# Patient Record
Sex: FEMALE | Race: WHITE
[De-identification: names, ages, dates, MRNs, and addresses within clinical notes are randomized per-mention and may not be internally consistent; named-entity substitution may affect disease eponyms.]

---

## 2019-06-02 ENCOUNTER — HOSPITAL ENCOUNTER (EMERGENCY)
Dept: HOSPITAL 10 - FTE | Age: 44
Discharge: HOME | End: 2019-06-02
Payer: COMMERCIAL

## 2019-06-02 ENCOUNTER — HOSPITAL ENCOUNTER (EMERGENCY)
Dept: HOSPITAL 91 - FTE | Age: 44
Discharge: HOME | End: 2019-06-02
Payer: COMMERCIAL

## 2019-06-02 VITALS — WEIGHT: 242.51 LBS | HEIGHT: 55 IN | BODY MASS INDEX: 56.12 KG/M2

## 2019-06-02 VITALS — HEART RATE: 89 BPM | RESPIRATION RATE: 18 BRPM | DIASTOLIC BLOOD PRESSURE: 89 MMHG | SYSTOLIC BLOOD PRESSURE: 140 MMHG

## 2019-06-02 DIAGNOSIS — N75.1: Primary | ICD-10-CM

## 2019-06-02 PROCEDURE — 56420 I&D BARTHOLINS GLAND ABSCESS: CPT

## 2019-06-02 PROCEDURE — 82962 GLUCOSE BLOOD TEST: CPT

## 2019-06-02 PROCEDURE — 99284 EMERGENCY DEPT VISIT MOD MDM: CPT

## 2019-06-02 RX ADMIN — DIAZEPAM 1 MG: 5 TABLET ORAL at 12:26

## 2019-06-02 RX ADMIN — LIDOCAINE HYDROCHLORIDE 1 ML: 10 INJECTION, SOLUTION EPIDURAL; INFILTRATION; INTRACAUDAL; PERINEURAL at 12:11

## 2019-06-02 NOTE — ERD
ER Documentation


Chief Complaint


Chief Complaint





POSSIBLE ABCESS ON VAGINAL AREA





HPI


43 year old F presents to the ED complaining of a recurrent abscess to her 


vaginal area, progressively growing and painful for the past 5 days. Pt states 


she has had these same sx reoccur for the past 5 years, her last abscess was a


bout 3 months ago. She states these abscesses usually spontaneously drain but 


today's abscess is more large and painful than previous. She denies any 


associated fevers or chills. Denies any urinary symptoms. Denies any other 


symptoms. Of note, pt also is complaining of increased thirst.





ROS


All systems reviewed and are negative except as per history of present illness.





Medications


Home Meds


Active Scripts


Sulfamethoxazole/Trimethoprim* (Bactrim Ds* Tablet) 1 Each Tablet, 1 TAB PO BID,


#14 TAB


   Prov:KATHRYN LARA PA-C         19


Reported Medications


Prenatal Vit/Fe Fumarate/Fa (Prenatal 1-1 Tablet) 1 Tab Tablet, 1 TAB PO DAILY


   3/15/12





Allergies


Allergies:  


Coded Allergies:  


     acetaminophen (Verified  Allergy, Intermediate, 14)


     hydrocodone (Verified  Allergy, Intermediate, 14)


     No Known Allergies (Verified  Allergy, Mild, 2/10/10)





PMhx/Soc


Medical and Surgical Hx:  pt denies Medical Hx, pt denies Surgical Hx


History of Surgery:  No


Anesthesia Reaction:  No


Hx Neurological Disorder:  No


Hx Respiratory Disorders:  No


Hx Cardiac Disorders:  No


Hx Psychiatric Problems:  No


Hx Miscellaneous Medical Probl:  No


Hx Alcohol Use:  No


Hx Substance Use:  No


Hx Tobacco Use:  No


Smoking Status:  Never smoker





FmHx


Family History:  No diabetes





Physical Exam


Vitals





Vital Signs


  Date      Temp  Pulse  Resp  B/P (MAP)   Pulse Ox  O2          O2 Flow    FiO2


Time                                                 Delivery    Rate


    19  98.1     89    18      140/89        99


     11:32                          (106)





Physical Exam


Const:   No acute distress


Head:   Atraumatic 


Eyes:    Normal Conjunctiva


ENT:    Normal External Ears, Nose and Mouth.


Neck:               Full range of motion. No meningismus.


Abd:    Soft, non tender, non distended. Normal bowel sounds


Pelvic Exam:    + tender fluctuant mass in the upper right medial labia majora 


with surrounding erythema.  


Skin:   No petechiae or rashes


Neur:   Awake and alert


Psych:    Normal Mood and Affect


Results 24 hrs





Laboratory Tests


                         Test
            19
12:49


                         Bedside Glucose     88 mg/dL





Current Medications


 Medications
   Dose
          Sig/Hayden
       Start Time
   Status  Last


 (Trade)       Ordered        Route
 PRN     Stop Time              Admin
Dose


                              Reason                                Admin


 Lidocaine
     5 ml           ONCE  ONCE
    19        DC       



(Xylocaine                    INFIL
         12:30
 19


1%
  (Mpf))                                  12:31


 Diazepam
      5 mg           ONCE  ONCE
    19        DC            19


(Valium)                      PO
            12:30
 19                12:26



                                             12:31








Procedures/MDM


LABS 


POC glucose:   88 





Abscess Incision and Drainage with irrigation by me: 


Location:       Right upper medial labia majora 


Anesthesia:       1% lidocaine, local


Technique:       Area prepped with lidocaine, small incision made with 11 blade 


scalpel. 


Packing:       None 


Complications:   None  








MEDICAL DECISION MAKIN yo F presents with recurrent bartholin abscess. Incision and drainage 


performed with significant amount of foul smelling purulent discharge. Pt had 


significant improvement status post procedure. She has no fever here and vital 


signs are normal. I have low suspicion for sepsis, deep space infection, or 


foreign body. She was discharged home with PO abx. Recommended Epsom salt baths 


for the next 4 days and PCP follow in one week. May need referral to general 


surgery for definite treatment of her recurrent abscesses. Strict return 


precautions discussed.  





Of note, pt also complained of increased thirst. POC glucose 88. She has no 


evidence of DM. I have low suspicion for DKA or HHOS. 








PRESCRIPTIONS:  Bactrim








SPECIALIST FOLLOW UP RECOMMENDED: general surgery 


Patient has been advised to follow up with primary care in 1-2 days.





Departure


Diagnosis:  


   Primary Impression:  


   Bartholin's gland abscess


Condition:  Stable


Patient Instructions:  Bartholin's Cyst (I And D)


Referrals:  


[facility]


YOU HAVE RECEIVED A MEDICAL SCREENING EXAM AND THE RESULTS INDICATE THAT YOU DO 


NOT HAVE A CONDITION THAT REQUIRES URGENT TREATMENT IN THE EMERGENCY DEPARTMENT.





FURTHER EVALUATION AND TREATMENT OF YOUR CONDITION CAN WAIT UNTIL YOU ARE SEEN 


IN YOUR DOCTORS OFFICE WITHIN THE NEXT 1-2 DAYS. IT IS YOUR RESPONSIBILITY TO 


MAKE AN APPOINTMENT FOR FOLOW-UP CARE.





IF YOU HAVE A PRIMARY DOCTOR


--you should call your primary doctor and schedule an appointment





IF YOU DO NOT HAVE A PRIMARY DOCTOR YOU CAN CALL OUR PHYSICIAN REFERRAL HOTLINE 


AT


 [phone] 





IF YOU CAN NOT AFFORD TO SEE A PHYSICIAN YOU CAN CHOSE FROM THE FOLLOWING 


[facility] [phone] [address] [place]





[facility] [phone] [address] [facility]. [facility]





[facility] [phone] [address] [facility] [phone] [address] [facility] [phone] [address] within [facility]. [facility] [phone] [address] [facility]. [facility]


YOU HAVE RECEIVED A MEDICAL SCREENING EXAM AND THE RESULTS INDICATE THAT YOU DO 


NOT HAVE A CONDITION THAT REQUIRES URGENT TREATMENT IN THE EMERGENCY DEPARTMENT.





FURTHER EVALUATION AND TREATMENT OF YOUR CONDITION CAN WAIT UNTIL YOU ARE SEEN 


IN YOUR DOCTORS OFFICE WITHIN THE NEXT 1-2 DAYS. IT IS YOUR RESPONSIBILITY TO 


MAKE AN APPOINTMENT FOR FOLOW-UP CARE.





IF YOU HAVE A PRIMARY DOCTOR


--you should call your primary doctor and schedule and appointment





IF YOU DO NOT HAVE A PRIMARY DOCTOR YOU CAN CALL OUR PHYSICIAN REFERRAL HOTLINE 


AT [phone].





IF YOU CAN NOT AFFORD TO SEE A PHYSICIAN YOU CAN CHOSE FROM THE FOLLOWING [facility]


INSTITUTIONS:





[facility]


[address] [place] [address]





[facility]


[address] [place] [address]





Located within [facility]


[address] [place] [address]





Additional Instructions:  


Take the antibiotics for the next 7 days.  I recommend Epsom salt baths for at 


least 10 minutes 2 times a day for the next 4 days.  He should see your primary 


care provider and asked to be referred to general surgery for removal of your 


Bartholin glands to prevent reinfection and abscesses.  Monitor for any fevers 


or chills.  Return here for any new or worsening symptoms.











KATHRYN LARA PA-C      2019 19:53

## 2019-06-11 ENCOUNTER — HOSPITAL ENCOUNTER (EMERGENCY)
Dept: HOSPITAL 10 - FTE | Age: 44
Discharge: HOME | End: 2019-06-11
Payer: COMMERCIAL

## 2019-06-11 ENCOUNTER — HOSPITAL ENCOUNTER (EMERGENCY)
Dept: HOSPITAL 91 - FTE | Age: 44
Discharge: HOME | End: 2019-06-11
Payer: COMMERCIAL

## 2019-06-11 VITALS — DIASTOLIC BLOOD PRESSURE: 80 MMHG | RESPIRATION RATE: 18 BRPM | SYSTOLIC BLOOD PRESSURE: 154 MMHG | HEART RATE: 70 BPM

## 2019-06-11 VITALS
WEIGHT: 253.53 LBS | HEIGHT: 60 IN | BODY MASS INDEX: 49.77 KG/M2 | BODY MASS INDEX: 49.77 KG/M2 | HEIGHT: 60 IN | WEIGHT: 253.53 LBS

## 2019-06-11 DIAGNOSIS — M54.31: Primary | ICD-10-CM

## 2019-06-11 DIAGNOSIS — M54.32: ICD-10-CM

## 2019-06-11 PROCEDURE — 81025 URINE PREGNANCY TEST: CPT

## 2019-06-11 PROCEDURE — 99284 EMERGENCY DEPT VISIT MOD MDM: CPT

## 2019-06-11 PROCEDURE — 82962 GLUCOSE BLOOD TEST: CPT

## 2019-06-11 PROCEDURE — 96372 THER/PROPH/DIAG INJ SC/IM: CPT

## 2019-06-11 RX ADMIN — KETOROLAC TROMETHAMINE 1 MG: 30 INJECTION, SOLUTION INTRAMUSCULAR at 12:47

## 2019-06-11 RX ADMIN — DEXAMETHASONE SODIUM PHOSPHATE 1 MG: 10 INJECTION, SOLUTION INTRAMUSCULAR; INTRAVENOUS at 12:47

## 2019-06-11 NOTE — ERD
ER Documentation


Chief Complaint


Chief Complaint





HAD COLONOSCOPY LAST THURSDAY, SINCE FRIDAY LEG WEAKNESS





HPI


Patient is a 43 years old female presenting to the clinic for bilateral lower l


eg weakness since Friday. Patient reports of a colonoscopy and endoscopy on 


Thursday. Patient reports of severe sharp bilateral lower leg pain that starts 


from her gluteal region. Patient reports of dry mouth with increased water 


intake. but denies dysuria, urinary frequency/urgency, stool/urinary 


incontinence. Patient reports taking OTC Ibuprofen without resolution. Patient 


reports of left lower foot swelling. Patient reports her last lab work-up showed


normal sugar levels by her PCP.





ROS


All systems reviewed and are negative except as per history of present illness.





Medications


Home Meds


Active Scripts


Methylprednisolone* (Medrol* DOSE PACK) 4 Mg/Dose-Pack Tab.ds.pk, 4 MG PO .AS 


DIRECTED for 5 Days, PACKET


   Prov:TACO LAZO PA-C         6/11/19


Meloxicam* (Meloxicam*) 7.5 Mg Tablet, 7.5 MG PO DAILY, #30 TAB


   Prov:TACO LAZO PA-C         6/11/19


Sulfamethoxazole/Trimethoprim* (Bactrim Ds* Tablet) 1 Each Tablet, 1 TAB PO BID,


#14 TAB


   Prov:TOMAIGRKATHRYN ARGUETA PA-C         6/2/19


Reported Medications


Prenatal Vit/Fe Fumarate/Fa (Prenatal 1-1 Tablet) 1 Tab Tablet, 1 TAB PO DAILY


   3/15/12





Allergies


Allergies:  


Coded Allergies:  


     hydrocodone (Verified  Allergy, Intermediate, 4/23/14)


     codeine (Verified  Allergy, Unknown, 6/11/19)





PMhx/Soc


Medical and Surgical Hx:  pt denies Medical Hx, pt denies Surgical Hx


History of Surgery:  No


Anesthesia Reaction:  No


Hx Neurological Disorder:  No


Hx Respiratory Disorders:  No


Hx Cardiac Disorders:  No


Hx Psychiatric Problems:  No


Hx Miscellaneous Medical Probl:  No


Hx Alcohol Use:  No


Hx Substance Use:  No


Hx Tobacco Use:  No





Physical Exam


Vitals





Vital Signs


  Date      Temp  Pulse  Resp  B/P (MAP)   Pulse Ox  O2          O2 Flow    FiO2


Time                                                 Delivery    Rate


   6/11/19  98.5     70    18      154/80        99


     11:33                          (104)





Physical Exam


Const:   No acute distress


Head:   Atraumatic 


Eyes:    Normal Conjunctiva


ENT:    Normal External Ears, Nose. No lesion noted in oral exam with mild 


dental carries and discolored right buccal mucosa. 


Neck:               Full range of motion. No meningismus.


Resp:   Clear to auscultation bilaterally


Cardio:   Regular rate and rhythm, no murmurs


Abd:    Soft, non tender, non distended. Normal bowel sounds


Skin:   No petechiae or rashes


Back:   Tenderness to mid bilateral gluteal region that follows the sciatic 


nerve distribution.


Ext:    No cyanosis, or edema noted. 2+ Pedal pulse bilaterally.


Neur:   Awake and alert. Sensation intact. 


Psych:    Normal Mood and Affect


Results 24 hrs





Laboratory Tests


            Test
                      6/11/19
12:38  6/11/19
12:39


            POC Beta HCG, Qualitative  NEGATIVE


            Bedside Glucose                               99 mg/dL





Current Medications


 Medications
   Dose
          Sig/Hayden
       Start Time
   Status  Last


 (Trade)       Ordered        Route
 PRN     Stop Time              Admin
Dose


                              Reason                                Admin


                10 mg          ONCE  ONCE
    6/11/19       DC           6/11/19


Dexamethasone                 IM
            12:30
                       12:47




  (Decadron)                                6/11/19 12:31


 Ketorolac
     60 mg          ONCE  STAT
    6/11/19       DC           6/11/19


Tromethamine
                 IM
            12:15
                       12:47



 (Toradol)                                   6/11/19 12:17








Procedures/MDM


Patient was seen and evaluated for bilateral lower leg weakness and pain which 


most likely represents sciatica (bilaterally). Finger glucose reading of 99 and 


no further workup for diabetes is required. No signs of edema noted and so lower


extremity ultrasound withheld. Patient's oral exam was relatively unremarkable 


with mild hematoma secondary to trauma from Endoscopy. Dry mouth most likely 


from procedure as well and does not require no further work as there are no 


signs of leukoplakia. Patient was instructed to f/u with her PCP for further 


evaluation (possible ENT referral vs GI).





Patient reports significant improvement of pain status post dexamethasone and 


Toradol IM. Patient is stable and ready for discharge.





Departure


Diagnosis:  


   Primary Impression:  


   Sciatica


   Laterality:  bilateral  Qualified Codes:  M54.31 - Sciatica, right side; 


   M54.32 - Sciatica, left side


Condition:  Stable


Patient Instructions:  Back Pain W/ Sciatica, Understanding Sciatica


Referrals:  


[facility]





Additional Instructions:  


Patient advised to return to the ED immediately for new or worsening symptoms. 


Patient advised to follow up with primary care provider in the next 24-48 hours.


Patient verbalized understanding and agrees with treatment plan and course of 


action.














If patient has no primary care they may follow up with














[facility] + [address]





[place] [address]














or














[facility]





[address] [place] [address]














or














[facility]





[address] [place] [address]











TACO LAZO PA-C               Jun 11, 2019 12:27

## 2019-06-12 ENCOUNTER — HOSPITAL ENCOUNTER (INPATIENT)
Dept: HOSPITAL 10 - E/R | Age: 44
LOS: 3 days | Discharge: HOME | DRG: 552 | End: 2019-06-15
Payer: COMMERCIAL

## 2019-06-12 ENCOUNTER — HOSPITAL ENCOUNTER (INPATIENT)
Dept: HOSPITAL 91 - PP2 | Age: 44
LOS: 3 days | Discharge: HOME | DRG: 552 | End: 2019-06-15
Payer: COMMERCIAL

## 2019-06-12 VITALS
WEIGHT: 266.1 LBS | HEIGHT: 64 IN | BODY MASS INDEX: 45.43 KG/M2 | HEIGHT: 64 IN | BODY MASS INDEX: 45.43 KG/M2 | WEIGHT: 266.1 LBS

## 2019-06-12 DIAGNOSIS — M48.07: ICD-10-CM

## 2019-06-12 DIAGNOSIS — R20.0: ICD-10-CM

## 2019-06-12 DIAGNOSIS — E66.01: ICD-10-CM

## 2019-06-12 DIAGNOSIS — M51.17: Primary | ICD-10-CM

## 2019-06-12 LAB
ADD MAN DIFF?: NO
ANION GAP: 8 (ref 5–13)
BASOPHIL #: 0.1 10^3/UL (ref 0–0.1)
BASOPHILS %: 0.4 % (ref 0–2)
BLOOD UREA NITROGEN: 19 MG/DL (ref 7–20)
CALCIUM: 9.1 MG/DL (ref 8.4–10.2)
CARBON DIOXIDE: 22 MMOL/L (ref 21–31)
CHLORIDE: 111 MMOL/L (ref 97–110)
CREATININE: 0.88 MG/DL (ref 0.44–1)
EOSINOPHILS #: 0 10^3/UL (ref 0–0.5)
EOSINOPHILS %: 0.1 % (ref 0–7)
GLUCOSE: 118 MG/DL (ref 70–220)
HEMATOCRIT: 35 % (ref 37–47)
HEMOGLOBIN: 11.8 G/DL (ref 12–16)
IMMATURE GRANS #M: 0.11 10^3/UL (ref 0–0.03)
IMMATURE GRANS % (M): 0.8 % (ref 0–0.43)
INR: 0.94
LYMPHOCYTES #: 3.2 10^3/UL (ref 0.8–2.9)
LYMPHOCYTES %: 22.2 % (ref 15–51)
MEAN CORPUSCULAR HEMOGLOBIN: 28 PG (ref 29–33)
MEAN CORPUSCULAR HGB CONC: 33.7 G/DL (ref 32–37)
MEAN CORPUSCULAR VOLUME: 83.1 FL (ref 82–101)
MEAN PLATELET VOLUME: 10.9 FL (ref 7.4–10.4)
MONOCYTE #: 0.8 10^3/UL (ref 0.3–0.9)
MONOCYTES %: 5.7 % (ref 0–11)
NEUTROPHIL #: 10.3 10^3/UL (ref 1.6–7.5)
NEUTROPHILS %: 70.8 % (ref 39–77)
NUCLEATED RED BLOOD CELLS #: 0 10^3/UL (ref 0–0)
NUCLEATED RED BLOOD CELLS%: 0 /100WBC (ref 0–0)
PARTIAL THROMBOPLASTIN TIME: 24 SEC (ref 23–35)
PLATELET COUNT: 223 10^3/UL (ref 140–415)
POTASSIUM: 3.9 MMOL/L (ref 3.5–5.1)
PROTIME: 12.7 SEC (ref 11.9–14.9)
PT RATIO: 1
RED BLOOD COUNT: 4.21 10^6/UL (ref 4.2–5.4)
RED CELL DISTRIBUTION WIDTH: 13.7 % (ref 11.5–14.5)
SODIUM: 141 MMOL/L (ref 135–144)
TROPONIN-I: < 0.012 NG/ML (ref 0–0.12)
WHITE BLOOD COUNT: 14.5 10^3/UL (ref 4.8–10.8)

## 2019-06-12 PROCEDURE — 81001 URINALYSIS AUTO W/SCOPE: CPT

## 2019-06-12 PROCEDURE — 83735 ASSAY OF MAGNESIUM: CPT

## 2019-06-12 PROCEDURE — 96375 TX/PRO/DX INJ NEW DRUG ADDON: CPT

## 2019-06-12 PROCEDURE — 84100 ASSAY OF PHOSPHORUS: CPT

## 2019-06-12 PROCEDURE — 97162 PT EVAL MOD COMPLEX 30 MIN: CPT

## 2019-06-12 PROCEDURE — 97116 GAIT TRAINING THERAPY: CPT

## 2019-06-12 PROCEDURE — 85610 PROTHROMBIN TIME: CPT

## 2019-06-12 PROCEDURE — 86140 C-REACTIVE PROTEIN: CPT

## 2019-06-12 PROCEDURE — 81025 URINE PREGNANCY TEST: CPT

## 2019-06-12 PROCEDURE — 85651 RBC SED RATE NONAUTOMATED: CPT

## 2019-06-12 PROCEDURE — 93005 ELECTROCARDIOGRAM TRACING: CPT

## 2019-06-12 PROCEDURE — 80307 DRUG TEST PRSMV CHEM ANLYZR: CPT

## 2019-06-12 PROCEDURE — 96374 THER/PROPH/DIAG INJ IV PUSH: CPT

## 2019-06-12 PROCEDURE — 71045 X-RAY EXAM CHEST 1 VIEW: CPT

## 2019-06-12 PROCEDURE — 85730 THROMBOPLASTIN TIME PARTIAL: CPT

## 2019-06-12 PROCEDURE — 85025 COMPLETE CBC W/AUTO DIFF WBC: CPT

## 2019-06-12 PROCEDURE — 80048 BASIC METABOLIC PNL TOTAL CA: CPT

## 2019-06-12 PROCEDURE — 36415 COLL VENOUS BLD VENIPUNCTURE: CPT

## 2019-06-12 PROCEDURE — 70450 CT HEAD/BRAIN W/O DYE: CPT

## 2019-06-12 PROCEDURE — 80053 COMPREHEN METABOLIC PANEL: CPT

## 2019-06-12 PROCEDURE — 99285 EMERGENCY DEPT VISIT HI MDM: CPT

## 2019-06-12 PROCEDURE — 72131 CT LUMBAR SPINE W/O DYE: CPT

## 2019-06-12 PROCEDURE — 84484 ASSAY OF TROPONIN QUANT: CPT

## 2019-06-12 RX ADMIN — DEXAMETHASONE SODIUM PHOSPHATE 1 MG: 4 INJECTION, SOLUTION INTRAMUSCULAR; INTRAVENOUS at 23:11

## 2019-06-12 RX ADMIN — ONDANSETRON HYDROCHLORIDE 1 MG: 2 INJECTION, SOLUTION INTRAMUSCULAR; INTRAVENOUS at 23:11

## 2019-06-13 VITALS — RESPIRATION RATE: 19 BRPM | DIASTOLIC BLOOD PRESSURE: 61 MMHG | HEART RATE: 58 BPM | SYSTOLIC BLOOD PRESSURE: 151 MMHG

## 2019-06-13 VITALS — HEART RATE: 65 BPM | DIASTOLIC BLOOD PRESSURE: 65 MMHG | SYSTOLIC BLOOD PRESSURE: 101 MMHG | RESPIRATION RATE: 18 BRPM

## 2019-06-13 VITALS — SYSTOLIC BLOOD PRESSURE: 118 MMHG | HEART RATE: 71 BPM | DIASTOLIC BLOOD PRESSURE: 62 MMHG | RESPIRATION RATE: 20 BRPM

## 2019-06-13 VITALS — DIASTOLIC BLOOD PRESSURE: 57 MMHG | HEART RATE: 70 BPM | RESPIRATION RATE: 20 BRPM | SYSTOLIC BLOOD PRESSURE: 116 MMHG

## 2019-06-13 LAB
ADD UMIC: YES
C-REACTIVE PROTEIN: 0.7 MG/DL (ref 0–0.9)
ERYTHROCYTE SEDIMENTATION RATE: 10 MM/HR (ref 0–20)
UR ASCORBIC ACID: NEGATIVE MG/DL
UR BACTERIA: (no result) /HPF
UR BILIRUBIN (DIP): NEGATIVE MG/DL
UR BLOOD (DIP): (no result) MG/DL
UR CLARITY: (no result)
UR COLOR: YELLOW
UR GLUCOSE (DIP): NEGATIVE MG/DL
UR KETONES (DIP): NEGATIVE MG/DL
UR LEUKOCYTE ESTERASE (DIP): (no result) LEU/UL
UR NITRITE (DIP): NEGATIVE MG/DL
UR PH (DIP): 6 (ref 5–9)
UR RBC: 2 /HPF (ref 0–5)
UR SPECIFIC GRAVITY (DIP): 1.02 (ref 1–1.03)
UR SQUAMOUS EPITHELIAL CELL: (no result) /HPF
UR TOTAL PROTEIN (DIP): NEGATIVE MG/DL
UR UROBILINOGEN (DIP): (no result) MG/DL
UR WBC: 14 /HPF (ref 0–5)

## 2019-06-13 RX ADMIN — MORPHINE SULFATE 1 MG: 2 INJECTION, SOLUTION INTRAMUSCULAR; INTRAVENOUS at 20:57

## 2019-06-13 RX ADMIN — ACETAMINOPHEN 1 MG: 500 TABLET, FILM COATED ORAL at 15:13

## 2019-06-13 RX ADMIN — MORPHINE SULFATE PRN MG: 2 INJECTION, SOLUTION INTRAMUSCULAR; INTRAVENOUS at 11:09

## 2019-06-13 RX ADMIN — MORPHINE SULFATE 1 MG: 2 INJECTION, SOLUTION INTRAMUSCULAR; INTRAVENOUS at 11:09

## 2019-06-13 RX ADMIN — MORPHINE SULFATE 1 MG: 2 INJECTION, SOLUTION INTRAMUSCULAR; INTRAVENOUS at 15:14

## 2019-06-13 RX ADMIN — MORPHINE SULFATE PRN MG: 2 INJECTION, SOLUTION INTRAMUSCULAR; INTRAVENOUS at 23:20

## 2019-06-13 RX ADMIN — KETOROLAC TROMETHAMINE PRN MG: 30 INJECTION, SOLUTION INTRAMUSCULAR at 08:48

## 2019-06-13 RX ADMIN — MORPHINE SULFATE PRN MG: 2 INJECTION, SOLUTION INTRAMUSCULAR; INTRAVENOUS at 18:05

## 2019-06-13 RX ADMIN — MORPHINE SULFATE 1 MG: 2 INJECTION, SOLUTION INTRAMUSCULAR; INTRAVENOUS at 18:05

## 2019-06-13 RX ADMIN — KETOROLAC TROMETHAMINE 1 MG: 30 INJECTION, SOLUTION INTRAMUSCULAR at 08:48

## 2019-06-13 RX ADMIN — MORPHINE SULFATE 1 MG: 2 INJECTION, SOLUTION INTRAMUSCULAR; INTRAVENOUS at 23:20

## 2019-06-13 RX ADMIN — MORPHINE SULFATE PRN MG: 2 INJECTION, SOLUTION INTRAMUSCULAR; INTRAVENOUS at 20:57

## 2019-06-13 RX ADMIN — MORPHINE SULFATE PRN MG: 2 INJECTION, SOLUTION INTRAMUSCULAR; INTRAVENOUS at 15:14

## 2019-06-13 RX ADMIN — PANTOPRAZOLE SODIUM 1 MG: 40 TABLET, DELAYED RELEASE ORAL at 18:04

## 2019-06-13 NOTE — HP
Date/Time of Note


Date/Time of Note


DATE: 6/13/19 


TIME: 16:56





Assessment/Plan


VTE Prophylaxis


Risk score (from Ns)>0 risk:  3


SCD applied (from Ns):  Yes


Pharmacological prophylaxis:  NA/contraindicated


Pharm contraindication:  low risk/ambulating





Lines/Catheters


IV Catheter Type (from [facility]):  Saline Lock





Assessment/Plan


Hospital Course


Morbidly obese 43 years old female presenting with bilateral weakness and 


numbness in the setting of lumbosacral spinal disease.





Admit to [facility] floor


PT OT


Lumbar spine MRI


Neurosurgery consultation


Dexamethasone p.o.


Pantoprazole for GI prophylaxis given steroid use and history of gastritis


Pain and nausea management


SCDs for DVT prophylaxis


Problems:  


(1) Weakness of both legs


(2) Back pain


(3) Numbness in both legs


(4) Lumbar foraminal stenosis


(5) Spinal stenosis of lumbosacral region


(6) Lumbar disc disease


(7) Morbid obesity with BMI of 45.0-49.9, adult


Result Diagram:  


6/12/19 2230 6/12/19 2230





Results 24hrs





Laboratory Tests


Test
                 6/12/19
22:30  6/12/19
22:50  6/13/19
11:50  6/13/19
12:57


White Blood Count           14.5  H


Red Blood Count              4.21


Hemoglobin                  11.8  L


Hematocrit                  35.0  L


Mean Corpuscular             83.1


Volume


Mean Corpuscular            28.0  L


Hemoglobin


Mean Corpuscular            33.7  
  
              
              



Hemoglobin
Concent


Red Cell                     13.7


Distribution Width


Platelet Count                223


Mean Platelet Volume        10.9  H


Immature                   0.800  H


Granulocytes %


Neutrophils %                70.8


Lymphocytes %                22.2


Monocytes %                   5.7


Eosinophils %                 0.1


Basophils %                   0.4


Nucleated Red Blood           0.0


Cells %


Immature                   0.110  H


Granulocytes #


Neutrophils #               10.3  H


Lymphocytes #                3.2  H


Monocytes #                   0.8


Eosinophils #                 0.0


Basophils #                   0.1


Nucleated Red Blood           0.0


Cells #


Prothrombin Time             12.7


Prothrombin Time              1.0


Ratio


[employer]R [employer]           0.94  
  
              
              



Normalized
Ratio


Activated                   24.0  
  
              
              



Partial
Thromboplast


Time


Sodium Level                  141


Potassium Level               3.9


Chloride Level               111  H


Carbon Dioxide Level           22


Anion Gap                       8


Blood Urea Nitrogen            19


Creatinine                   0.88


Est Glomerular        > 60  
        
              
              



Filtrat Rate
mL/min


Glucose Level                 118


Calcium Level                 9.1


Troponin I            < 0.012


POC Beta HCG,                        NEGATIVE


Qualitative


Urine Color                                         YELLOW


Urine Clarity                                       CLOUDY  A


Urine pH                                                    6.0


Urine Specific                                            1.023


Gravity


Urine Ketones                                       NEGATIVE


Urine Nitrite                                       NEGATIVE


Urine Bilirubin                                     NEGATIVE


Urine Urobilinogen                                          1+  H


Urine Leukocyte                                             2+  H


Esterase


Urine Microscopic                                             2


RBC


Urine Microscopic                                           14  H


WBC


Urine Squamous        
              
              MODERATE  
    



Epithelial
Cells


Urine Bacteria                                      FEW  A


Urine Hemoglobin                                            1+  H


Urine Glucose                                       NEGATIVE


Urine Total Protein                                 NEGATIVE


Urine Opiates Screen                                Positive


Urine Barbiturates                                  Negative


Urine Amphetamines                                  Negative


Screen


Urine                                               Negative


Benzodiazepines


Screen


Urine Cocaine Screen                                Negative


Urine Cannabinoids                                  Negative


Erythrocyte                                                                 10


Sedimentation Rate


C-Reactive Protein                                                         0.7








HPI/ROS


Admit Date/Time


Admit Date/Time


Jun 13, 2019 at 00:51





Hx of Present Illness


43 years old female with history of morbid obesity, gastritis, who presents with


bilateral leg weakness and numbness associated with radicular lumbar pain for 


the past 7 days.  She had EGD and colonoscopy done as outpatient by Dr. Rose 


given family history of gastric cancer.  2 days after the procedure she noticed 


back pain and associated weakness /numbness in her legs.  He presented to the 


emergency room with the same symptoms earlier this week  but she  was discharged


directly to home.  She denies IV drug abuse, fever, chills.  She reports history


of trauma to her back as a result of a fall 5 years ago.  Denies personal 


history of cancer, she endorses family history of gastric cancer.  Denies saddle


anesthesia.  Denies change in urination or bowel habits. 


CT of the lumbar spine in the emergency room showed multilevel neural foraminal 


stenosis as well as canal stenosis with degenerative disc disease.  Patient was 


admitted for further evaluation and treatment of bilateral weakness in the 


setting of lumbosacral radiculopathy.  Neurosurgery Dr. Mcginnis was consulted.





PMH/Family/Social


Past Medical History


Medications





Current Medications


Dexamethasone (Decadron) 4 mg Q6 PO  Last administered on 6/13/19at 11:09; Admin


Dose 4 MG;  Start 6/13/19 at 07:00


Ondansetron HCl (Zofran Inj) 4 mg Q4H  PRN IV NAUSEA AND/OR VOMITING;  Start 


6/13/19 at 07:00


Morphine Sulfate (morphine) 2 mg Q2H  PRN IV MODERATE PAIN Last administered on 


6/13/19at 15:14; Admin Dose 2 MG;  Start 6/13/19 at 07:00


Acetaminophen (Tylenol Tab) 1,000 mg Q6H  PRN PO MILD PAIN(1-3)OR ELEVATED TEMP 


Last administered on 6/13/19at 15:13; Admin Dose 1,000 MG;  Start 6/13/19 at 


07:00


Ketorolac Tromethamine (Toradol) 30 mg Q6H  PRN IV PAIN LEVEL 1-3 Last 


administered on 6/13/19at 08:48; Admin Dose 30 MG;  Start 6/13/19 at 07:00;  


Stop 6/16/19 at 06:59


Coded Allergies:  


     hydrocodone (Unverified  Allergy, Intermediate, 6/13/19)


     codeine (Unverified  Allergy, Unknown, 6/13/19)





Social History


Smoking Status:  Never smoker





Exam/Review of Systems


Vital Signs


Vitals





Vital Signs


  Date      Temp  Pulse  Resp  B/P (MAP)   Pulse Ox  O2          O2 Flow    FiO2


Time                                                 Delivery    Rate


   6/13/19  97.7     65    18      101/65        95


     15:03                           (77)


   6/13/19                                           Room Air


     02:01








Exam


Exam


Gen.: In no acute distress, pleasant and cooperative


Eyes: Anicteric, conjunctiva normal, PERRLA, EOM intact


HEENT: Normocephalic, atraumatic, hearing grossly intact, oral mucosa moist, no 


oral lesions


Neck: Supple, no masses, trachea midline


Cardiovascular: Regular rate and rhythm, no peripheral edema, no murmurs, no 


gallops, no rubs


Respiratory: Clear to auscultation bilaterally, no use of accessory muscles of 


respiration, no wheezes, expiration not prolonged


Extremities: No cyanosis, no edema, no calf tenderness, pulses bilaterally 


palpable, extremities warm and perfused


Abdomen: Soft, not distended, nontender, bowel sounds present, no guarding, no 


rebound


Neurological: Alert and oriented x3, speech normal, CN 2-12 no deficit, mild 


bilateral lower extremity slightly worse on the right side, bilateral below-knee


numbness to light palpation, no deficit in temperature sensation, no saddle 


anesthesia


: No Hunter catheter


Dermatologic no rash, no ulcers


Heme: No acute bleeding, no ecchymosis or petechia


Psych: No anxiety depression, mood and affect appropriate











KILLIAN HALLMAN MD                  Jun 13, 2019 16:56

## 2019-06-13 NOTE — ERD
ER Documentation


Chief Complaint


Chief Complaint





STATES NUMB/ WEAK FROM WAIST DOWN. SEEN YESTERDAY FOR SAME





HPI


This is a 43 female who comes in with complaints of numbness and weakness from 


the waist down.  She was seen yesterday and diagnosed with sciatica but that is 


gotten worse.  She denies any bowel or bladder incontinence or bowel or bladder 


retention.  She states pain is mild to moderate to severe in intensity depending


on the time of day movements associated.  No fevers no chills.  No other current


complaints.





ROS


All systems reviewed and are negative except as per history of present illness.





Medications


Home Meds


Discontinued Reported Medications


Prenatal Vit/Fe Fumarate/Fa (Prenatal 1-1 Tablet) 1 Tab Tablet, 1 TAB PO DAILY


   3/15/12


Discontinued Scripts


Methylprednisolone* (Medrol* DOSE PACK) 4 Mg/Dose-Pack Tab.ds.pk, 4 MG PO .AS 


DIRECTED for 5 Days, PACKET


   Prov:TACO LAZO PA-C         6/11/19


Meloxicam* (Meloxicam*) 7.5 Mg Tablet, 7.5 MG PO DAILY, #30 TAB


   Prov:TACO LAZO PA-C         6/11/19


Sulfamethoxazole/Trimethoprim* (Bactrim Ds* Tablet) 1 Each Tablet, 1 TAB PO BID,


#14 TAB


   Prov:KATHRYN LARA PA-C         6/2/19





Allergies


Allergies:  


Coded Allergies:  


     hydrocodone (Unverified  Allergy, Intermediate, 6/13/19)


     codeine (Unverified  Allergy, Unknown, 6/13/19)





PMhx/Soc


History of Surgery:  No


Anesthesia Reaction:  No


Hx Neurological Disorder:  No


Hx Respiratory Disorders:  No


Hx Cardiac Disorders:  No


Hx Psychiatric Problems:  No


Hx Miscellaneous Medical Probl:  No


Hx Alcohol Use:  No


Hx Substance Use:  No


Hx Tobacco Use:  No


Smoking Status:  Never smoker





Physical Exam


Vitals





Vital Signs


  Date      Temp  Pulse  Resp  B/P (MAP)   Pulse Ox  O2          O2 Flow    FiO2


Time                                                 Delivery    Rate


   6/13/19           76    62      111/60       100  Room Air


     02:01                           (77)


   6/13/19           61    20      123/85        98  Room Air


     00:58                           (98)


   6/12/19           58    16      134/82       100  Room Air


     23:32                           (99)


   6/12/19  98.2     66    18      158/80        97


     19:45                          (106)





Physical Exam


Const:   No acute distress


Head:   Atraumatic 


Eyes:    Normal Conjunctiva


ENT:    Normal External Ears, Nose and Mouth.


Neck:               Full range of motion. No meningismus.


Resp:   Clear to auscultation bilaterally


Cardio:   Regular rate and rhythm, no murmurs


Abd:    Soft, non tender, non distended. Normal bowel sounds


Skin:   No petechiae or rashes


Back:   No midline or flank tenderness


Ext:    No cyanosis, or edema


Neur:   Awake and alert


Psych:    Normal Mood and Affect


Result Diagram:  


6/12/19 2230 6/12/19 2230





Results 24 hrs





Laboratory Tests


       Test
                                 6/12/19
22:30  6/12/19
22:50


       White Blood Count                     14.5 10^3/ul


       Red Blood Count                       4.21 10^6/ul


       Hemoglobin                               11.8 g/dl


       Hematocrit                                  35.0 %


       Mean Corpuscular Volume                    83.1 fl


       Mean Corpuscular Hemoglobin                28.0 pg


       Mean Corpuscular Hemoglobin
Concent     33.7 g/dl 
  



       Red Cell Distribution Width                 13.7 %


       Platelet Count                         223 10^3/UL


       Mean Platelet Volume                       10.9 fl


       Immature Granulocytes %                    0.800 %


       Neutrophils %                               70.8 %


       Lymphocytes %                               22.2 %


       Monocytes %                                  5.7 %


       Eosinophils %                                0.1 %


       Basophils %                                  0.4 %


       Nucleated Red Blood Cells %            0.0 /100WBC


       Immature Granulocytes #              0.110 10^3/ul


       Neutrophils #                         10.3 10^3/ul


       Lymphocytes #                          3.2 10^3/ul


       Monocytes #                            0.8 10^3/ul


       Eosinophils #                          0.0 10^3/ul


       Basophils #                            0.1 10^3/ul


       Nucleated Red Blood Cells #            0.0 10^3/ul


       Prothrombin Time                          12.7 Sec


       Prothrombin Time Ratio                         1.0


       INR International Normalized
Ratio           0.94 
  



       Activated Partial
Thromboplast Time      24.0 Sec 
  



       Sodium Level                            141 mmol/L


       Potassium Level                         3.9 mmol/L


       Chloride Level                          111 mmol/L


       Carbon Dioxide Level                     22 mmol/L


       Anion Gap                                        8


       Blood Urea Nitrogen                       19 mg/dl


       Creatinine                              0.88 mg/dl


       Est Glomerular Filtrat Rate
mL/min   > 60 mL/min 
   



       Glucose Level                            118 mg/dl


       Calcium Level                            9.1 mg/dl


       Troponin I                           < 0.012 ng/ml


       POC Beta HCG, Qualitative                            NEGATIVE





Current Medications


 Medications
   Dose
          Sig/Hayden
       Start Time
   Status  Last


 (Trade)       Ordered        Route
 PRN     Stop Time              Admin
Dose


                              Reason                                Admin


 Morphine       4 mg           ONCE  STAT
    6/12/19       DC           6/12/19


Sulfate
                      IV
            23:00
                       23:11



(morphine)                                   6/12/19 23:01


 Ondansetron    4 mg           ONCE  STAT
    6/12/19       DC           6/12/19


HCl
  (Zofran                 IV
            23:00
                       23:11



Inj)                                         6/12/19 23:01


                4 mg           ONCE  ONCE
    6/12/19       DC           6/12/19


Dexamethasone                 IV
            23:00
                       23:11




  (Decadron)                                6/12/19 23:01








Procedures/MDM


EKG: 


Rate/Rhythm:             [Normal Sinus Rhythm]


QRS, ST, T-waves:    [No changes consistent w/ acute ischemia]


Impression:      [No evidence of ischemia or arrhythmia]





Chest X-ray 1V Interpreted by me:


Soft Tissue:                                               No acute 


abnormalities


Bones:                                                    No acute abnormalities


Mediastinum/Cardiac Silhouette/Lungs:     [No acute abnormalities] 











medical decision making: This is a very pleasant patient who comes in with what 


looks to be lumbar stenosis severe abdominal back pain.  CT does show lumbar 


stenosis.  Patient will be admitted to Dr. Schulte.  A consult has been put in 


for Dr. Mcginnis as well to see the patient.  This is not an emergent consult but 


is an urgent consult.   has not made aware.  Patient made aware as well.  No 


evidence of cauda equina syndrome on serial exams here in the ER





Departure


Diagnosis:  


   Primary Impression:  


   Numbness


Condition:  Stable











ABIMBOLA NAVARRO             Jun 13, 2019 02:19

## 2019-06-14 VITALS — SYSTOLIC BLOOD PRESSURE: 119 MMHG | DIASTOLIC BLOOD PRESSURE: 60 MMHG | HEART RATE: 87 BPM | RESPIRATION RATE: 18 BRPM

## 2019-06-14 VITALS — SYSTOLIC BLOOD PRESSURE: 111 MMHG | DIASTOLIC BLOOD PRESSURE: 61 MMHG | HEART RATE: 71 BPM | RESPIRATION RATE: 16 BRPM

## 2019-06-14 VITALS — RESPIRATION RATE: 18 BRPM | DIASTOLIC BLOOD PRESSURE: 75 MMHG | SYSTOLIC BLOOD PRESSURE: 119 MMHG

## 2019-06-14 VITALS — SYSTOLIC BLOOD PRESSURE: 99 MMHG | RESPIRATION RATE: 18 BRPM | HEART RATE: 60 BPM | DIASTOLIC BLOOD PRESSURE: 55 MMHG

## 2019-06-14 LAB
ADD MAN DIFF?: NO
ALANINE AMINOTRANSFERASE: 35 IU/L (ref 13–69)
ALBUMIN/GLOBULIN RATIO: 1.26
ALBUMIN: 3.8 G/DL (ref 3.3–4.9)
ALKALINE PHOSPHATASE: 52 IU/L (ref 42–121)
ANION GAP: 7 (ref 5–13)
ASPARTATE AMINO TRANSFERASE: 22 IU/L (ref 15–46)
BASOPHIL #: 0 10^3/UL (ref 0–0.1)
BASOPHILS %: 0.1 % (ref 0–2)
BILIRUBIN,DIRECT: 0 MG/DL (ref 0–0.2)
BILIRUBIN,TOTAL: 0.3 MG/DL (ref 0.2–1.3)
BLOOD UREA NITROGEN: 20 MG/DL (ref 7–20)
CALCIUM: 9.3 MG/DL (ref 8.4–10.2)
CARBON DIOXIDE: 25 MMOL/L (ref 21–31)
CHLORIDE: 108 MMOL/L (ref 97–110)
CREATININE: 0.88 MG/DL (ref 0.44–1)
EOSINOPHILS #: 0 10^3/UL (ref 0–0.5)
EOSINOPHILS %: 0 % (ref 0–7)
GLOBULIN: 3 G/DL (ref 1.3–3.2)
GLUCOSE: 239 MG/DL (ref 70–220)
HEMATOCRIT: 36.7 % (ref 37–47)
HEMOGLOBIN: 12.1 G/DL (ref 12–16)
IMMATURE GRANS #M: 0.13 10^3/UL (ref 0–0.03)
IMMATURE GRANS % (M): 1 % (ref 0–0.43)
LYMPHOCYTES #: 1.3 10^3/UL (ref 0.8–2.9)
LYMPHOCYTES %: 9.9 % (ref 15–51)
MAGNESIUM: 2.2 MG/DL (ref 1.7–2.5)
MEAN CORPUSCULAR HEMOGLOBIN: 27.4 PG (ref 29–33)
MEAN CORPUSCULAR HGB CONC: 33 G/DL (ref 32–37)
MEAN CORPUSCULAR VOLUME: 83 FL (ref 82–101)
MEAN PLATELET VOLUME: 11.7 FL (ref 7.4–10.4)
MONOCYTE #: 0.4 10^3/UL (ref 0.3–0.9)
MONOCYTES %: 3 % (ref 0–11)
NEUTROPHIL #: 11.6 10^3/UL (ref 1.6–7.5)
NEUTROPHILS %: 86 % (ref 39–77)
NUCLEATED RED BLOOD CELLS #: 0 10^3/UL (ref 0–0)
NUCLEATED RED BLOOD CELLS%: 0 /100WBC (ref 0–0)
PHOSPHORUS: 3.8 MG/DL (ref 2.5–4.9)
PLATELET COUNT: 224 10^3/UL (ref 140–415)
POTASSIUM: 5 MMOL/L (ref 3.5–5.1)
RED BLOOD COUNT: 4.42 10^6/UL (ref 4.2–5.4)
RED CELL DISTRIBUTION WIDTH: 13.6 % (ref 11.5–14.5)
SODIUM: 140 MMOL/L (ref 135–144)
TOTAL PROTEIN: 6.8 G/DL (ref 6.1–8.1)
WHITE BLOOD COUNT: 13.5 10^3/UL (ref 4.8–10.8)

## 2019-06-14 RX ADMIN — MORPHINE SULFATE PRN MG: 2 INJECTION, SOLUTION INTRAMUSCULAR; INTRAVENOUS at 11:25

## 2019-06-14 RX ADMIN — MORPHINE SULFATE PRN MG: 2 INJECTION, SOLUTION INTRAMUSCULAR; INTRAVENOUS at 06:35

## 2019-06-14 RX ADMIN — MORPHINE SULFATE PRN MG: 2 INJECTION, SOLUTION INTRAMUSCULAR; INTRAVENOUS at 08:36

## 2019-06-14 RX ADMIN — MORPHINE SULFATE PRN MG: 2 INJECTION, SOLUTION INTRAMUSCULAR; INTRAVENOUS at 04:00

## 2019-06-14 RX ADMIN — KETOROLAC TROMETHAMINE PRN MG: 30 INJECTION, SOLUTION INTRAMUSCULAR at 16:37

## 2019-06-14 RX ADMIN — PANTOPRAZOLE SODIUM 1 MG: 40 TABLET, DELAYED RELEASE ORAL at 05:43

## 2019-06-14 RX ADMIN — ACETAMINOPHEN 1 MG: 500 TABLET, FILM COATED ORAL at 11:23

## 2019-06-14 RX ADMIN — HEPARIN SODIUM 1 UNIT: 5000 INJECTION, SOLUTION INTRAVENOUS; SUBCUTANEOUS at 21:05

## 2019-06-14 RX ADMIN — MORPHINE SULFATE 1 MG: 2 INJECTION, SOLUTION INTRAMUSCULAR; INTRAVENOUS at 08:36

## 2019-06-14 RX ADMIN — MORPHINE SULFATE 1 MG: 2 INJECTION, SOLUTION INTRAMUSCULAR; INTRAVENOUS at 04:00

## 2019-06-14 RX ADMIN — MORPHINE SULFATE 1 MG: 2 INJECTION, SOLUTION INTRAMUSCULAR; INTRAVENOUS at 21:22

## 2019-06-14 RX ADMIN — MORPHINE SULFATE PRN MG: 2 INJECTION, SOLUTION INTRAMUSCULAR; INTRAVENOUS at 18:04

## 2019-06-14 RX ADMIN — ACETAMINOPHEN 1 MG: 500 TABLET, FILM COATED ORAL at 18:10

## 2019-06-14 RX ADMIN — MORPHINE SULFATE 1 MG: 2 INJECTION, SOLUTION INTRAMUSCULAR; INTRAVENOUS at 18:04

## 2019-06-14 RX ADMIN — MORPHINE SULFATE 1 MG: 2 INJECTION, SOLUTION INTRAMUSCULAR; INTRAVENOUS at 13:55

## 2019-06-14 RX ADMIN — KETOROLAC TROMETHAMINE 1 MG: 30 INJECTION, SOLUTION INTRAMUSCULAR at 16:37

## 2019-06-14 RX ADMIN — MORPHINE SULFATE 1 MG: 2 INJECTION, SOLUTION INTRAMUSCULAR; INTRAVENOUS at 06:35

## 2019-06-14 RX ADMIN — PANTOPRAZOLE SODIUM SCH MG: 40 TABLET, DELAYED RELEASE ORAL at 05:43

## 2019-06-14 RX ADMIN — MORPHINE SULFATE 1 MG: 2 INJECTION, SOLUTION INTRAMUSCULAR; INTRAVENOUS at 11:25

## 2019-06-14 RX ADMIN — MORPHINE SULFATE PRN MG: 2 INJECTION, SOLUTION INTRAMUSCULAR; INTRAVENOUS at 13:55

## 2019-06-14 RX ADMIN — MORPHINE SULFATE PRN MG: 2 INJECTION, SOLUTION INTRAMUSCULAR; INTRAVENOUS at 21:22

## 2019-06-14 RX ADMIN — HEPARIN SODIUM SCH UNIT: 5000 INJECTION, SOLUTION INTRAVENOUS; SUBCUTANEOUS at 21:05

## 2019-06-14 NOTE — PN
Date/Time of Note


Date/Time of Note


DATE: 6/14/19 


TIME: 20:37





Assessment/Plan


VTE Prophylaxis


Risk score (from Nsg)>0 risk:  3


SCD applied (from Nsg):  Yes


Pharmacological prophylaxis:  heparin





Lines/Catheters


IV Catheter Type (from Nrsg):  Saline Lock





Assessment/Plan


Hospital Course


Morbidly obese 43 years old female presenting with bilateral weakness and 


numbness in the setting of lumbosacral spinal disease. She was able to walk to 


the bathroom independently . Neurosurgery Dr Mcginnis was consulted by ED and 


admitting MD unfortunately patient was not evaluated . She did not fit in the 


MRI machine. I asked Dr Atkinson to see her 








PT OT


Lumbar spine MRI if possible as outpatient 


Neurosurgery consultation to Dr tAkinson 


Dexamethasone p.o.


Pantoprazole for GI prophylaxis given steroid use and history of gastritis


Pain and nausea management


SCDs for DVT prophylaxis


Problems:  


(1) Lumbar foraminal stenosis


(2) Spinal stenosis of lumbosacral region


(3) Lumbar disc disease


(4) Morbid obesity with BMI of 45.0-49.9, adult


(5) Weakness of both legs


(6) Numbness in both legs


(7) Back pain


Result Diagram:  


6/14/19 0512                                                                    


           6/14/19 0512





Results 24hrs





Laboratory Tests


               Test
                                6/14/19
05:12


               White Blood Count                          13.5  H


               Red Blood Count                             4.42


               Hemoglobin                                  12.1


               Hematocrit                                 36.7  L


               Mean Corpuscular Volume                     83.0


               Mean Corpuscular Hemoglobin                27.4  L


               Mean Corpuscular Hemoglobin
Concent        33.0  



               Red Cell Distribution Width                 13.6


               Platelet Count                               224


               Mean Platelet Volume                       11.7  H


               Immature Granulocytes %                   1.000  H


               Neutrophils %                              86.0  H


               Lymphocytes %                               9.9  L


               Monocytes %                                  3.0


               Eosinophils %                                0.0


               Basophils %                                  0.1


               Nucleated Red Blood Cells %                  0.0


               Immature Granulocytes #                   0.130  H


               Neutrophils #                              11.6  H


               Lymphocytes #                                1.3


               Monocytes #                                  0.4


               Eosinophils #                                0.0


               Basophils #                                  0.0


               Nucleated Red Blood Cells #                  0.0


               Sodium Level                                 140


               Potassium Level                              5.0


               Chloride Level                               108


               Carbon Dioxide Level                          25


               Anion Gap                                      7


               Blood Urea Nitrogen                           20


               Creatinine                                  0.88


               Est Glomerular Filtrat Rate
mL/min   > 60  



               Glucose Level                              239  #H


               Calcium Level                                9.3


               Phosphorus Level                             3.8


               Magnesium Level                              2.2


               Total Bilirubin                              0.3


               Direct Bilirubin                            0.00


               Indirect Bilirubin                           0.3


               Aspartate Amino Transf
(AST/SGOT)            22  



               Alanine Aminotransferase
(ALT/SGPT)          35  



               Alkaline Phosphatase                          52


               Total Protein                                6.8


               Albumin                                      3.8


               Globulin                                    3.00


               Albumin/Globulin Ratio                      1.26








Exam/Review of Systems


Exam


Vitals





Vital Signs


  Date      Temp  Pulse  Resp  B/P (MAP)   Pulse Ox  O2          O2 Flow    FiO2


Time                                                 Delivery    Rate


   6/14/19  97.4     71    16      111/61        96


     15:23                           (78)


   6/14/19                                           Room Air


     08:49








Intake and Output





6/13/19 6/13/19 6/14/19





1515:00


23:00


07:00





IntakeIntake Total


680 ml


440 ml





OutputOutput Total


100 ml


350 ml





BalanceBalance


580 ml


90 ml











Exam


Gen.: In no acute distress, pleasant and cooperative


Eyes: Anicteric, conjunctiva normal, PERRLA, EOM intact


HEENT: Normocephalic, atraumatic, hearing grossly intact, oral mucosa moist, no 


oral lesions


Neck: Supple, no masses, trachea midline


Cardiovascular: Regular rate and rhythm, no peripheral edema, no murmurs, no 


gallops, no rubs


Respiratory: Clear to auscultation bilaterally, no use of accessory muscles of 


respiration, no wheezes, expiration not prolonged


Extremities: No cyanosis, no edema, no calf tenderness, pulses bilaterally 


palpable, extremities warm and perfused


Abdomen: Soft, not distended, nontender, bowel sounds present, no guarding, no 


rebound


Neurological: Alert and oriented x3, speech normal, CN 2-12 no deficit, mild 


bilateral lower extremity slightly worse on the right side, bilateral below-knee


numbness to light palpation, no deficit in temperature sensation, no saddle 


anesthesia


: No Hunter catheter


Dermatologic no rash, no ulcers


Heme: No acute bleeding, no ecchymosis or petechia


Psych: No anxiety depression, mood and affect appropriate





Results


Results 24hrs





Laboratory Tests


               Test
                                6/14/19
05:12


               White Blood Count                          13.5  H


               Red Blood Count                             4.42


               Hemoglobin                                  12.1


               Hematocrit                                 36.7  L


               Mean Corpuscular Volume                     83.0


               Mean Corpuscular Hemoglobin                27.4  L


               Mean Corpuscular Hemoglobin
Concent        33.0  



               Red Cell Distribution Width                 13.6


               Platelet Count                               224


               Mean Platelet Volume                       11.7  H


               Immature Granulocytes %                   1.000  H


               Neutrophils %                              86.0  H


               Lymphocytes %                               9.9  L


               Monocytes %                                  3.0


               Eosinophils %                                0.0


               Basophils %                                  0.1


               Nucleated Red Blood Cells %                  0.0


               Immature Granulocytes #                   0.130  H


               Neutrophils #                              11.6  H


               Lymphocytes #                                1.3


               Monocytes #                                  0.4


               Eosinophils #                                0.0


               Basophils #                                  0.0


               Nucleated Red Blood Cells #                  0.0


               Sodium Level                                 140


               Potassium Level                              5.0


               Chloride Level                               108


               Carbon Dioxide Level                          25


               Anion Gap                                      7


               Blood Urea Nitrogen                           20


               Creatinine                                  0.88


               Est Glomerular Filtrat Rate
mL/min   > 60  



               Glucose Level                              239  #H


               Calcium Level                                9.3


               Phosphorus Level                             3.8


               Magnesium Level                              2.2


               Total Bilirubin                              0.3


               Direct Bilirubin                            0.00


               Indirect Bilirubin                           0.3


               Aspartate Amino Transf
(AST/SGOT)            22  



               Alanine Aminotransferase
(ALT/SGPT)          35  



               Alkaline Phosphatase                          52


               Total Protein                                6.8


               Albumin                                      3.8


               Globulin                                    3.00


               Albumin/Globulin Ratio                      1.26








Medications


Medication





Current Medications


Dexamethasone (Decadron) 4 mg Q6 PO  Last administered on 6/14/19at 18:03; Admin


Dose 4 MG;  Start 6/13/19 at 07:00


Ondansetron HCl (Zofran Inj) 4 mg Q4H  PRN IV NAUSEA AND/OR VOMITING;  Start 


6/13/19 at 07:00


Morphine Sulfate (morphine) 2 mg Q2H  PRN IV MODERATE PAIN Last administered on 


6/14/19at 18:04; Admin Dose 2 MG;  Start 6/13/19 at 07:00


Acetaminophen (Tylenol Tab) 1,000 mg Q6H  PRN PO MILD PAIN(1-3)OR ELEVATED TEMP 


Last administered on 6/14/19at 18:10; Admin Dose 1,000 MG;  Start 6/13/19 at 


07:00


Ketorolac Tromethamine (Toradol) 30 mg Q6H  PRN IV PAIN LEVEL 1-3 Last 


administered on 6/14/19at 16:37; Admin Dose 30 MG;  Start 6/13/19 at 07:00;  


Stop 6/16/19 at 06:59


Pantoprazole (Protonix Tab) 40 mg DAILY@06 PO  Last administered on 6/14/19at 


05:43; Admin Dose 40 MG;  Start 6/14/19 at 06:00











KILLIAN HALLMAN MD                  Jun 14, 2019 20:42

## 2019-06-15 VITALS — HEART RATE: 72 BPM | RESPIRATION RATE: 18 BRPM | SYSTOLIC BLOOD PRESSURE: 133 MMHG | DIASTOLIC BLOOD PRESSURE: 62 MMHG

## 2019-06-15 VITALS — HEART RATE: 58 BPM | RESPIRATION RATE: 18 BRPM | DIASTOLIC BLOOD PRESSURE: 57 MMHG | SYSTOLIC BLOOD PRESSURE: 98 MMHG

## 2019-06-15 VITALS — HEART RATE: 60 BPM | RESPIRATION RATE: 20 BRPM | DIASTOLIC BLOOD PRESSURE: 57 MMHG | SYSTOLIC BLOOD PRESSURE: 105 MMHG

## 2019-06-15 LAB
ADD MAN DIFF?: NO
ALANINE AMINOTRANSFERASE: 33 IU/L (ref 13–69)
ALBUMIN/GLOBULIN RATIO: 1.11
ALBUMIN: 3.9 G/DL (ref 3.3–4.9)
ALKALINE PHOSPHATASE: 59 IU/L (ref 42–121)
ANION GAP: 9 (ref 5–13)
ASPARTATE AMINO TRANSFERASE: 17 IU/L (ref 15–46)
BASOPHIL #: 0 10^3/UL (ref 0–0.1)
BASOPHILS %: 0.2 % (ref 0–2)
BILIRUBIN,DIRECT: 0 MG/DL (ref 0–0.2)
BILIRUBIN,TOTAL: 0.4 MG/DL (ref 0.2–1.3)
BLOOD UREA NITROGEN: 27 MG/DL (ref 7–20)
CALCIUM: 9.2 MG/DL (ref 8.4–10.2)
CARBON DIOXIDE: 26 MMOL/L (ref 21–31)
CHLORIDE: 104 MMOL/L (ref 97–110)
CREATININE: 0.96 MG/DL (ref 0.44–1)
EOSINOPHILS #: 0 10^3/UL (ref 0–0.5)
EOSINOPHILS %: 0 % (ref 0–7)
GLOBULIN: 3.5 G/DL (ref 1.3–3.2)
GLUCOSE: 221 MG/DL (ref 70–220)
HEMATOCRIT: 39.8 % (ref 37–47)
HEMOGLOBIN: 13.3 G/DL (ref 12–16)
IMMATURE GRANS #M: 0.28 10^3/UL (ref 0–0.03)
IMMATURE GRANS % (M): 1.9 % (ref 0–0.43)
LYMPHOCYTES #: 1.3 10^3/UL (ref 0.8–2.9)
LYMPHOCYTES %: 8.9 % (ref 15–51)
MAGNESIUM: 2.2 MG/DL (ref 1.7–2.5)
MEAN CORPUSCULAR HEMOGLOBIN: 27.8 PG (ref 29–33)
MEAN CORPUSCULAR HGB CONC: 33.4 G/DL (ref 32–37)
MEAN CORPUSCULAR VOLUME: 83.3 FL (ref 82–101)
MEAN PLATELET VOLUME: 12 FL (ref 7.4–10.4)
MONOCYTE #: 0.6 10^3/UL (ref 0.3–0.9)
MONOCYTES %: 4.1 % (ref 0–11)
NEUTROPHIL #: 12.7 10^3/UL (ref 1.6–7.5)
NEUTROPHILS %: 84.9 % (ref 39–77)
NUCLEATED RED BLOOD CELLS #: 0 10^3/UL (ref 0–0)
NUCLEATED RED BLOOD CELLS%: 0 /100WBC (ref 0–0)
PHOSPHORUS: 4 MG/DL (ref 2.5–4.9)
PLATELET COUNT: 226 10^3/UL (ref 140–415)
POTASSIUM: 4.3 MMOL/L (ref 3.5–5.1)
RED BLOOD COUNT: 4.78 10^6/UL (ref 4.2–5.4)
RED CELL DISTRIBUTION WIDTH: 13.4 % (ref 11.5–14.5)
SODIUM: 139 MMOL/L (ref 135–144)
TOTAL PROTEIN: 7.4 G/DL (ref 6.1–8.1)
WHITE BLOOD COUNT: 14.9 10^3/UL (ref 4.8–10.8)

## 2019-06-15 RX ADMIN — MORPHINE SULFATE PRN MG: 2 INJECTION, SOLUTION INTRAMUSCULAR; INTRAVENOUS at 07:50

## 2019-06-15 RX ADMIN — IBUPROFEN 1 MG: 800 TABLET, FILM COATED ORAL at 10:17

## 2019-06-15 RX ADMIN — MORPHINE SULFATE 1 MG: 2 INJECTION, SOLUTION INTRAMUSCULAR; INTRAVENOUS at 07:50

## 2019-06-15 RX ADMIN — MORPHINE SULFATE PRN MG: 2 INJECTION, SOLUTION INTRAMUSCULAR; INTRAVENOUS at 16:38

## 2019-06-15 RX ADMIN — MORPHINE SULFATE PRN MG: 2 INJECTION, SOLUTION INTRAMUSCULAR; INTRAVENOUS at 02:15

## 2019-06-15 RX ADMIN — MORPHINE SULFATE 1 MG: 2 INJECTION, SOLUTION INTRAMUSCULAR; INTRAVENOUS at 05:14

## 2019-06-15 RX ADMIN — MORPHINE SULFATE 1 MG: 2 INJECTION, SOLUTION INTRAMUSCULAR; INTRAVENOUS at 10:19

## 2019-06-15 RX ADMIN — IBUPROFEN SCH MG: 800 TABLET ORAL at 14:33

## 2019-06-15 RX ADMIN — METHYLPREDNISOLONE 1 MG: 4 TABLET ORAL at 10:19

## 2019-06-15 RX ADMIN — MORPHINE SULFATE 1 MG: 2 INJECTION, SOLUTION INTRAMUSCULAR; INTRAVENOUS at 16:38

## 2019-06-15 RX ADMIN — IBUPROFEN 1 MG: 800 TABLET, FILM COATED ORAL at 14:33

## 2019-06-15 RX ADMIN — MORPHINE SULFATE 1 MG: 2 INJECTION, SOLUTION INTRAMUSCULAR; INTRAVENOUS at 02:15

## 2019-06-15 RX ADMIN — IBUPROFEN SCH MG: 800 TABLET ORAL at 10:17

## 2019-06-15 RX ADMIN — MORPHINE SULFATE PRN MG: 2 INJECTION, SOLUTION INTRAMUSCULAR; INTRAVENOUS at 05:14

## 2019-06-15 RX ADMIN — PANTOPRAZOLE SODIUM 1 MG: 40 TABLET, DELAYED RELEASE ORAL at 05:14

## 2019-06-15 RX ADMIN — GABAPENTIN 1 MG: 300 CAPSULE ORAL at 10:17

## 2019-06-15 RX ADMIN — MORPHINE SULFATE PRN MG: 2 INJECTION, SOLUTION INTRAMUSCULAR; INTRAVENOUS at 14:32

## 2019-06-15 RX ADMIN — MORPHINE SULFATE PRN MG: 2 INJECTION, SOLUTION INTRAMUSCULAR; INTRAVENOUS at 10:19

## 2019-06-15 RX ADMIN — MORPHINE SULFATE 1 MG: 2 INJECTION, SOLUTION INTRAMUSCULAR; INTRAVENOUS at 14:32

## 2019-06-15 RX ADMIN — HEPARIN SODIUM 1 UNIT: 5000 INJECTION, SOLUTION INTRAVENOUS; SUBCUTANEOUS at 10:18

## 2019-06-15 RX ADMIN — HEPARIN SODIUM SCH UNIT: 5000 INJECTION, SOLUTION INTRAVENOUS; SUBCUTANEOUS at 10:18

## 2019-06-15 RX ADMIN — PANTOPRAZOLE SODIUM SCH MG: 40 TABLET, DELAYED RELEASE ORAL at 05:14

## 2019-06-15 NOTE — PN
Date/Time of Note


Date/Time of Note


DATE: 6/15/19 


TIME: 12:03





Assessment/Plan


VTE Prophylaxis


Risk score (from Nsg)>0 risk:  4


SCD applied (from Ns):  Yes


SCD contraindicated:  low risk/ambulating


Pharmacological prophylaxis:  LMWH





Lines/Catheters


IV Catheter Type (from Nrsg):  Saline Lock





Assessment/Plan


Assessment/Plan


1, low back pain, imoproived


2. likely spinal stenosis, foraminal stenosis, buyt responding medical therapy, 


plan outpatient mri and follow up with dr sánchez


3. d/c home


Result Diagram:  


6/15/19 0704                                                                    


           6/15/19 0704





Results 24hrs





Laboratory Tests


               Test
                                6/15/19
07:04


               White Blood Count                          14.9  H


               Red Blood Count                             4.78


               Hemoglobin                                  13.3


               Hematocrit                                  39.8


               Mean Corpuscular Volume                     83.3


               Mean Corpuscular Hemoglobin                27.8  L


               Mean Corpuscular Hemoglobin
Concent        33.4  



               Red Cell Distribution Width                 13.4


               Platelet Count                               226


               Mean Platelet Volume                       12.0  H


               Immature Granulocytes %                   1.900  H


               Neutrophils %                              84.9  H


               Lymphocytes %                               8.9  L


               Monocytes %                                  4.1


               Eosinophils %                                0.0


               Basophils %                                  0.2


               Nucleated Red Blood Cells %                  0.0


               Immature Granulocytes #                   0.280  H


               Neutrophils #                              12.7  H


               Lymphocytes #                                1.3


               Monocytes #                                  0.6


               Eosinophils #                                0.0


               Basophils #                                  0.0


               Nucleated Red Blood Cells #                  0.0


               Sodium Level                                 139


               Potassium Level                              4.3


               Chloride Level                               104


               Carbon Dioxide Level                          26


               Anion Gap                                      9


               Blood Urea Nitrogen                          27  H


               Creatinine                                  0.96


               Est Glomerular Filtrat Rate
mL/min   > 60  



               Glucose Level                               221  H


               Calcium Level                                9.2


               Phosphorus Level                             4.0


               Magnesium Level                              2.2


               Total Bilirubin                              0.4


               Direct Bilirubin                            0.00


               Indirect Bilirubin                           0.4


               Aspartate Amino Transf
(AST/SGOT)            17  



               Alanine Aminotransferase
(ALT/SGPT)          33  



               Alkaline Phosphatase                          59


               Total Protein                                7.4


               Albumin                                      3.9


               Globulin                                   3.50  H


               Albumin/Globulin Ratio                      1.11








Subjective


24 Hr Interval Summary


Free Text/Dictation


NO COMPLAINTS, PAIN MUCH IMPROVED, AMBULATING WITHOUT DIFFICULTY





Exam/Review of Systems


Exam


Vitals





Vital Signs


  Date      Temp  Pulse  Resp  B/P (MAP)   Pulse Ox  O2          O2 Flow    FiO2


Time                                                 Delivery    Rate


   6/15/19  97.9     60    20      105/57        97  Room Air


     08:30                           (73)








Intake and Output





6/14/19


6/14/19


6/15/19





1515:00


23:00


07:00





IntakeIntake Total


480 ml


360 ml


400 ml





BalanceBalance


480 ml


360 ml


400 ml











Exam


nad soft nt, ctab





Results


Results 24hrs





Laboratory Tests


               Test
                                6/15/19
07:04


               White Blood Count                          14.9  H


               Red Blood Count                             4.78


               Hemoglobin                                  13.3


               Hematocrit                                  39.8


               Mean Corpuscular Volume                     83.3


               Mean Corpuscular Hemoglobin                27.8  L


               Mean Corpuscular Hemoglobin
Concent        33.4  



               Red Cell Distribution Width                 13.4


               Platelet Count                               226


               Mean Platelet Volume                       12.0  H


               Immature Granulocytes %                   1.900  H


               Neutrophils %                              84.9  H


               Lymphocytes %                               8.9  L


               Monocytes %                                  4.1


               Eosinophils %                                0.0


               Basophils %                                  0.2


               Nucleated Red Blood Cells %                  0.0


               Immature Granulocytes #                   0.280  H


               Neutrophils #                              12.7  H


               Lymphocytes #                                1.3


               Monocytes #                                  0.6


               Eosinophils #                                0.0


               Basophils #                                  0.0


               Nucleated Red Blood Cells #                  0.0


               Sodium Level                                 139


               Potassium Level                              4.3


               Chloride Level                               104


               Carbon Dioxide Level                          26


               Anion Gap                                      9


               Blood Urea Nitrogen                          27  H


               Creatinine                                  0.96


               Est Glomerular Filtrat Rate
mL/min   > 60  



               Glucose Level                               221  H


               Calcium Level                                9.2


               Phosphorus Level                             4.0


               Magnesium Level                              2.2


               Total Bilirubin                              0.4


               Direct Bilirubin                            0.00


               Indirect Bilirubin                           0.4


               Aspartate Amino Transf
(AST/SGOT)            17  



               Alanine Aminotransferase
(ALT/SGPT)          33  



               Alkaline Phosphatase                          59


               Total Protein                                7.4


               Albumin                                      3.9


               Globulin                                   3.50  H


               Albumin/Globulin Ratio                      1.11








Medications


Medication





Current Medications


Ondansetron HCl (Zofran Inj) 4 mg Q4H  PRN IV NAUSEA AND/OR VOMITING;  Start 


6/13/19 at 07:00


Morphine Sulfate (morphine) 2 mg Q2H  PRN IV MODERATE PAIN Last administered on 


6/15/19at 10:19; Admin Dose 2 MG;  Start 6/13/19 at 07:00


Acetaminophen (Tylenol Tab) 1,000 mg Q6H  PRN PO MILD PAIN(1-3)OR ELEVATED TEMP 


Last administered on 6/14/19at 18:10; Admin Dose 1,000 MG;  Start 6/13/19 at 


07:00


Pantoprazole (Protonix Tab) 40 mg DAILY@06 PO  Last administered on 6/15/19at 


05:14; Admin Dose 40 MG;  Start 6/14/19 at 06:00


Heparin Sodium (Porcine) (Heparin  (5000 Units/1ml)) 5,000 unit BID SC  Last 


administered on 6/15/19at 10:18; Admin Dose 5,000 UNIT;  Start 6/14/19 at 21:00


Ibuprofen (Motrin) 800 mg Q8 NGT  Last administered on 6/15/19at 10:17; Admin 


Dose 800 MG;  Start 6/15/19 at 09:30


Gabapentin (Neurontin) 300 mg QAM GTB  Last administered on 6/15/19at 10:17; 


Admin Dose 300 MG;  Start 6/15/19 at 09:30


Methylprednisolone (Medrol) 4 mg AC  BREAKFAST PO ;  Start 6/16/19 at 07:30;  


Stop 6/20/19 at 07:31


Methylprednisolone (Medrol) 4 mg PC  LUNCH PO ;  Start 6/16/19 at 13:00;  Stop 


6/18/19 at 13:01


Methylprednisolone (Medrol) 4 mg PC  DINNER PO ;  Start 6/16/19 at 19:05;  Stop 


6/17/19 at 19:06


Methylprednisolone (Medrol) 8 mg HS PO ;  Start 6/16/19 at 21:00;  Stop 6/16/19 


at 21:01


Methylprednisolone (Medrol) 4 mg HS PO ;  Start 6/17/19 at 21:00;  Stop 6/19/19 


at 21:01


Methylprednisolone (Medrol) 24 mg ONCE PO  Last administered on 6/15/19at 10:19;


Admin Dose 24 MG;  Start 6/15/19 at 09:30;  Stop 6/15/19 at 23:45











TA OGDEN MD         Aurelio 15, 2019 12:05

## 2019-06-15 NOTE — CONS
Assessment/Plan


Assessment/Plan


Assessment/Plan (Daily)


Date of consultation: 6/15/2019


Requesting physician: Dr. Penelope Carrera





Consulting service: Neurosurgery





This is a 43-year-old female with reported history of gastritis, baseline nausea


who apparently underwent EGD and colonoscopy testing a week ago.  The patient 


says that the day after this procedure she felt axial low back pain, bilateral 


lower extremity paresthesias pain and her knees buckled and she fell.  The 


patient later on presented to the emergency department was evaluated and was 


discharged.  Since then the patient has had several further episodes of 


bilateral lower extremity paresthesias and pain and presented again to the 


emergency department when she was admitted to the hospital for further 


evaluation.  The patient has started receiving physical therapy and has 


ambulated a bit around her room but she still feels bilateral lower extremity 


paresthesias and tingling on a intermittent basis.





The patient denies any prior history of low back pain before the onset of the 


symptoms a week ago.  She currently denies bowel or bladder dysfunction.  She 


does not report focal upper or lower extremity weakness or numbness.  She has 


not been evaluated for the symptoms in the past by any other spine specialist.  


She denies any trauma to her low back or her lower extremities previously.





The patient was able to receive a CT of the lumbar spine without contrast.  


However due to the patient's large body habitus she would not fit in the MRI 


machine and an MRI could not be done.





Past medical history: Morbid obesity, please see above.





Allergies: Codeine?,  Hydrocodone?





Family history: GI cancer





Social history: Denies use of tobacco, EtOH, illicit or recreational drugs.





Review of systems: Denies chest pain, shortness of breath.  The patient has 


nausea and heartburn at baseline.  Denies loss of consciousness, convulsions, 


seizures.  Please see above for pertinent positives and negatives.





Physical examination: This is a young female sitting up in bed in no apparent 


distress.  She is very pleasant.  She is obese.  Her language is fluent.  She is


awake alert and oriented x4.  Her face is symmetric.  Extraocular movements are 


grossly intact.





Muscle bulk and tone is normal bilateral upper and lower extremities.  Deep 


tendon reflexes are 1+ bilateral upper and lower extremity's.  Motor strength is


5- out of 5 bilateral upper and lower extremities proximally and distally.  


Sensation to light touch is grossly normal bilateral upper and lower 


extremities.





There is no Chelsey sign present.  Toes are downgoing bilaterally.  There is no


dysdiadochokinesia.





Examination of the patient's lumbar spine reveals mild to moderate tenderness at


the lumbosacral junction and midline and over the paraspinal regions 


bilaterally.  There is no tenderness over the posterior superior iliac spine 


regions bilaterally.  Straight leg raise more than 20 degrees bilaterally causes


low back pain and some pain radiating to the posterior thighs on the respective 


tested side.





The patient is able to stand for about 2 to 3 minutes before needing to sit due 


to low back pain.  She is able to take a few steps with good balance.  However, 


the patient becomes grossly short of breath with standing and taking a few 


steps.





Imaging: CT of the lumbar spine without contrast: No gross evidence of fracture 


or subluxation noted.  There is loss of the patient's normal lumbar lordosis and


there is straightening of the lumbar spine.  Advanced arthritic changes more 


than would be expected for the patient's relatively young age including L3-4 and


L5-S1 degenerative disc disease with vacuum phenomenon, L3-4, L4-5 and L5-S1 


facet arthropathy and hypertrophy associated with vacuum phenomenon.  These 


findings result in variable degrees of foraminal stenosis at these levels 


putting some pressure on pressure on the exiting L3, L4 and L5 nerve roots.  


Assessment of the central canal is difficult on a CT scan alone.





Assessment/plan: I reviewed the above imaging studies in detail with the brandon


ent.  It is likely that the patient is experiencing bilateral lumbar 


radiculopathy that is associated with her advanced multilevel degenerative disc 


disease and facet arthropathy together with multilevel foraminal stenosis.  The 


patient has not had any conservative treatments for this pathology yet.  The 


patient would benefit from aggressive inpatient and subsequently outpatient ph


ysical therapy with emphasis put on core strengthening and lower extremity 


strengthening and range of motion.  The patient will also need to be set up with


outpatient MRI of the lumbar spine at the facility that could accommodate her 


large body habitus.  If her symptoms continue, she can also be evaluated by 


interventional pain management on an outpatient basis.  Once her MRI of the 


lumbar spine is done she can also follow-up with me on an outpatient basis.





In the meantime, I would recommend that the patient be started on a Medrol 


Dosepak, oral NSAID medication i.e. Motrin, and gabapentin starting at 300 mg a 


day for 3 days, if tolerating to increase to twice daily x3 days and if 


tolerating to increase to 3 times daily.  She should also continue with physical


therapy.  From a neurosurgery perspective the patient may be discharged as soon 


as she is more comfortable.





Lastly, the patient appears to be grossly short of breath even with standing and


taking a few steps, the etiology of the patient's short of breath is unclear to 


me.  Further evaluation of the shortness of breath will be deferred to the 


hospitalist service.











DAVIDE SORTO MD               Aurelio 15, 2019 09:39

## 2019-06-15 NOTE — DS
Date/Time of Note


Date/Time of Note


DATE: 6/15/19 


TIME: 12:11





Discharge Summary


Admission/Discharge Info


Admit Date/Time


Jun 13, 2019 at 00:51


Discharge Date/Time





Discharge Diagnosis


1. low back pain


Patient Condition:  Good


Hospital Course


patient admitted with low back pain, was unable to have inpatient mri due to not


fitting in machine at her weight.  CT demonstarted spinal stenosis and likely 


neuroforaminal stenosis.  She was seen by Dr Sánchez (neurosurg) and was started 


on medical therapy and responded quite well.  She is at this time able to 


ambulate independently and with controlled pain.  She will be discharged to 


home.  She is recommended to get MRI as an outpatietn to further clarify her 


back pathology and to decide if surgery will be required.


Home Meds


Active Scripts


Methylprednisolone* (Medrol* DOSE PACK) 4 Mg/Dose-Pack Tab.ds.pk, 4 MG PO .AS 


DIRECTED for 7 Days, PACKET


   Prov:TA OGDEN MD         6/15/19


Pantoprazole* (Pantoprazole*) 40 Mg Tablet.dr, 40 MG PO DAILY@06 for 14 Days


   discuss with your primary care doctor if this needs to be continued


   after 2 weeks


   Prov:TA OGDEN MD         6/15/19


Ibuprofen* (Motrin*) 800 Mg Tab, 800 MG NGT Q8 for 14 Days, #42 TAB


   take 1 tab 3x a day for 2 weeks NOT just if you have pain


   Prov:TA OGDEN MD         6/15/19


Gabapentin* (Gabapentin*) 300 Mg Capsule, 300 MG GTB BID for 30 Days, #60 CAP


   discuss if this medication needs to be adjusted or continued with


   your primary care docotor


   Prov:TA OGDEN MD         6/15/19


Discontinued Reported Medications


Prenatal Vit/Fe Fumarate/Fa (Prenatal 1-1 Tablet) 1 Tab Tablet, 1 TAB PO DAILY


   3/15/12


Discontinued Scripts


Methylprednisolone* (Medrol* DOSE PACK) 4 Mg/Dose-Pack Tab.ds.pk, 4 MG PO .AS 


DIRECTED for 5 Days, PACKET


   Prov:TACO LAZO PA-C         6/11/19


Meloxicam* (Meloxicam*) 7.5 Mg Tablet, 7.5 MG PO DAILY, #30 TAB


   Prov:TACO LAZO PA-C         6/11/19


Sulfamethoxazole/Trimethoprim* (Bactrim Ds* Tablet) 1 Each Tablet, 1 TAB PO BID,


#14 TAB


   Prov:KATHRYN LARA PA-C         6/2/19


Follow-up Plan


1. pcp 1-2 weeks


2. dr sánchez after mri


3. mri to be arranged


Primary Care Provider


Not On Staff Doctor


Pending Labs





Laboratory Tests


         Test
                                            6/15/19
07:04


         White Blood Count
                     14.9 10^3/ul
(4.8-10.8)


         Red Blood Count
                      4.78 10^6/ul
(4.20-5.40)


         Hemoglobin
                              13.3 g/dl
(12.0-16.0)


         Hematocrit
                                 39.8 %
(37.0-47.0)


         Mean Corpuscular Volume
                  83.3 fl
(82.0-101.0)


         Mean Corpuscular Hemoglobin
               27.8 pg
(29.0-33.0)


         Mean Corpuscular Hemoglobin
Concent      33.4 g/dl
(32.0-37.0)


         Red Cell Distribution Width
                13.4 %
(11.5-14.5)


         Platelet Count
                          226 10^3/UL
(140-415)


         Mean Platelet Volume
                       12.0 fl
(7.4-10.4)


         Immature Granulocytes %
                 1.900 %
(0.001-0.429)


         Neutrophils %
                              84.9 %
(39.0-77.0)


         Lymphocytes %
                               8.9 %
(15.0-51.0)


         Monocytes %
                                  4.1 %
(0.0-11.0)


         Eosinophils %
                                 0.0 %
(0.0-7.0)


         Basophils %
                                   0.2 %
(0.0-2.0)


         Nucleated Red Blood Cells %
             0.0 /100WBC
(0.0-0.0)


         Immature Granulocytes #
             0.280 10^3/ul
(0.0-0.031)


         Neutrophils #
                          12.7 10^3/ul
(1.6-7.5)


         Lymphocytes #
                           1.3 10^3/ul
(0.8-2.9)


         Monocytes #
                             0.6 10^3/ul
(0.3-0.9)


         Eosinophils #
                           0.0 10^3/ul
(0.0-0.5)


         Basophils #
                             0.0 10^3/ul
(0.0-0.1)


         Nucleated Red Blood Cells #
             0.0 10^3/ul
(0.0-0.0)


         Sodium Level
                             139 mmol/L
(135-144)


         Potassium Level
                          4.3 mmol/L
(3.5-5.1)


         Chloride Level
                            104 mmol/L
()


         Carbon Dioxide Level
                        26 mmol/L
(21-31)


         Anion Gap                                            9 (5-13)


         Blood Urea Nitrogen
                           27 mg/dl
(7-20)


         Creatinine
                             0.96 mg/dl
(0.44-1.00)


         Est Glomerular Filtrat Rate
mL/min   > 60 mL/min
(>60)


         Glucose Level
                              221 mg/dl
()


         Calcium Level
                            9.2 mg/dl
(8.4-10.2)


         Phosphorus Level
                          4.0 mg/dl
(2.5-4.9)


         Magnesium Level
                           2.2 mg/dl
(1.7-2.5)


         Total Bilirubin
                           0.4 mg/dl
(0.2-1.3)


         Direct Bilirubin
                       0.00 mg/dl
(0.00-0.20)


         Indirect Bilirubin
                          0.4 mg/dl
(0-1.1)


         Aspartate Amino Transf
(AST/SGOT)              17 IU/L
(15-46)


         Alanine Aminotransferase
(ALT/SGPT)            33 IU/L
(13-69)


         Alkaline Phosphatase
                         59 IU/L
()


         Total Protein
                              7.4 g/dl
(6.1-8.1)


         Albumin
                                    3.9 g/dl
(3.3-4.9)


         Globulin
                                  3.50 g/dl
(1.3-3.2)


         Albumin/Globulin Ratio                                   1.11














TA OGDEN MD         Aurelio 15, 2019 12:12

## 2019-06-15 NOTE — PDOCDIS
Discharge Instructions


DIAGNOSIS


Discharge Diagnosis


1. low back pain





CONDITION


                 [id number]
Patient Condition:  [id number]
Good








HOME CARE INSTRUCTIONS:


            [id number]
Diet Instructions:  [id number]
Reduced Calorie








ACTIVITY:


     [id number]
Activity Restrictions:  [id number]
Slowly Increase Activity








FOLLOW UP/APPOINTMENTS


Follow-up Plan


1. pcp 1-2 weeks


2. dr sánchez after mri


3. mri to be arranged











TA OGDEN MD         Aurelio 15, 2019 12:10